# Patient Record
Sex: MALE | Race: WHITE | NOT HISPANIC OR LATINO | Employment: FULL TIME | URBAN - METROPOLITAN AREA
[De-identification: names, ages, dates, MRNs, and addresses within clinical notes are randomized per-mention and may not be internally consistent; named-entity substitution may affect disease eponyms.]

---

## 2020-07-22 ENCOUNTER — TELEMEDICINE (OUTPATIENT)
Dept: GASTROENTEROLOGY | Facility: CLINIC | Age: 50
End: 2020-07-22
Payer: COMMERCIAL

## 2020-07-22 DIAGNOSIS — Z12.11 SCREEN FOR COLON CANCER: Primary | ICD-10-CM

## 2020-07-22 PROCEDURE — 99203 OFFICE O/P NEW LOW 30 MIN: CPT | Performed by: INTERNAL MEDICINE

## 2020-07-22 RX ORDER — CARVEDILOL PHOSPHATE 20 MG/1
20 CAPSULE, EXTENDED RELEASE ORAL DAILY
COMMUNITY
End: 2022-03-08

## 2020-07-22 RX ORDER — LISINOPRIL 10 MG/1
10 TABLET ORAL DAILY
COMMUNITY

## 2020-07-22 NOTE — H&P (VIEW-ONLY)
Virtual Regular Visit      Assessment/Plan:    Problem List Items Addressed This Visit        Other    Screen for colon cancer - Primary     Screening for colon cancer - patient is at average risk for colon cancer screening  Rule out colorectal lesions including polyps or malignancy         -Schedule for colonoscopy  -High-fiber diet     -Patient was given instructions about the colonoscopy prep     -Patient was explained about  the risks and benefits of the procedure  Risks including but not limited to bleeding, infection, perforation were explained in detail  Also explained about less than 100% sensitivity with the exam and other alternatives  Reason for visit is   Chief Complaint   Patient presents with    Virtual Regular Visit        Encounter provider Glenny Barajas MD    Provider located at [address] [place] [id number]      Recent Visits  No visits were found meeting these conditions  Showing recent visits within past 7 days and meeting all other requirements     Today's Visits  Date Type Provider Dept   07/22/20 Telemedicine MD Zoltan Shipman   Showing today's visits and meeting all other requirements     Future Appointments  No visits were found meeting these conditions  Showing future appointments within next 150 days and meeting all other requirements        The patient was identified by name and date of birth  Topherroxann August was informed that this is a telemedicine visit and that the visit is being conducted through [address] [place] [address] and patient was informed that this is not a secure, HIPAA-complaint platform  He agrees to proceed     My office door was closed  No one else was in the room  He acknowledged consent and understanding of privacy and security of the video platform   The patient has agreed to participate and understands they can discontinue the visit at any time  Patient is aware this is a billable service  Felipa Oneil is a 45 y o  male with history of hypertension was referred for colonoscopy  Patient never had colonoscopy in the past   Patient has regular bowel movements and denies any blood or mucus in the stool  Appetite is good and denies any recent weight loss  Denies any abdominal pain, nausea, or vomiting  Has no heartburn or acid reflux  Denies any difficulty swallowing  No past medical history on file  No past surgical history on file  Current Outpatient Medications   Medication Sig Dispense Refill    carvedilol (COREG CR) 20 MG 24 hr capsule Take 20 mg by mouth daily      lisinopril (ZESTRIL) 10 mg tablet Take 10 mg by mouth daily       No current facility-administered medications for this visit  Allergies not on file    REVIEW OF SYSTEMS:    CONSTITUTIONAL: Denies any fever, chills, rigors, and weight loss  HEENT: No earache or tinnitus  Denies hearing loss or visual disturbances  CARDIOVASCULAR: No chest pain or palpitations  RESPIRATORY: Denies any cough, hemoptysis, shortness of breath or dyspnea on exertion  GASTROINTESTINAL: As noted in the History of Present Illness  GENITOURINARY: No problems with urination  Denies any hematuria or dysuria  NEUROLOGIC: No dizziness or vertigo, denies headaches  MUSCULOSKELETAL: Denies any muscle or joint pain  SKIN: Denies skin rashes or itching  ENDOCRINE: Denies excessive thirst  Denies intolerance to heat or cold  PSYCHOSOCIAL: Denies depression or anxiety  Denies any recent memory loss  VIDEO PHYSICAL EXAMINATION:  Appearance and vitals taken from home devices    General Appearance:   Alert, cooperative, no distress   HEENT:  Normocephalic, atraumatic, anicteric  Neck supple, symmetrical, trachea midline  Lungs:   Equal chest rise and unlabored breathing, normal effort, no coughing  Cardiovascular:   No visualized JVD     Abdomen: No abdominal distension  Skin:   No jaundice, rashes, or lesions  Musculoskeletal:   Normal range of motion visualized  Psych:  Normal affect and normal insight  Neuro:  Alert and appropriate  I spent 16 minutes directly with the patient during this visit      VIRTUAL VISIT DISCLAIMER    [facility] acknowledges that he has consented to an online visit or consultation  He understands that the online visit is based solely on information provided by him, and that, in the absence of a face-to-face physical evaluation by the physician, the diagnosis he receives is both limited and provisional in terms of accuracy and completeness  This is not intended to replace a full medical face-to-face evaluation by the physician  Lor Blake understands and accepts these terms

## 2020-07-22 NOTE — PROGRESS NOTES
Virtual Regular Visit      Assessment/Plan:    Problem List Items Addressed This Visit        Other    Screen for colon cancer - Primary     Screening for colon cancer - patient is at average risk for colon cancer screening  Rule out colorectal lesions including polyps or malignancy         -Schedule for colonoscopy  -High-fiber diet     -Patient was given instructions about the colonoscopy prep     -Patient was explained about  the risks and benefits of the procedure  Risks including but not limited to bleeding, infection, perforation were explained in detail  Also explained about less than 100% sensitivity with the exam and other alternatives  Reason for visit is   Chief Complaint   Patient presents with    Virtual Regular Visit        Encounter provider Clementine Alford MD    Provider located at [address] [place], [place] [id number]      Recent Visits  No visits were found meeting these conditions  Showing recent visits within past 7 days and meeting all other requirements     Today's Visits  Date Type Provider Dept   07/22/20 Telemedicine MD Zoltan Saenz   Showing today's visits and meeting all other requirements     Future Appointments  No visits were found meeting these conditions  Showing future appointments within next 150 days and meeting all other requirements        The patient was identified by name and date of birth  Kodi Johnson was informed that this is a telemedicine visit and that the visit is being conducted through [address] [place] [address] and patient was informed that this is not a secure, HIPAA-complaint platform  He agrees to proceed     My office door was closed  No one else was in the room  He acknowledged consent and understanding of privacy and security of the video platform   The patient has agreed to participate and understands they can discontinue the visit at any time  Patient is aware this is a billable service  Ailyn Bella is a 45 y o  male with history of hypertension was referred for colonoscopy  Patient never had colonoscopy in the past   Patient has regular bowel movements and denies any blood or mucus in the stool  Appetite is good and denies any recent weight loss  Denies any abdominal pain, nausea, or vomiting  Has no heartburn or acid reflux  Denies any difficulty swallowing  No past medical history on file  No past surgical history on file  Current Outpatient Medications   Medication Sig Dispense Refill    carvedilol (COREG CR) 20 MG 24 hr capsule Take 20 mg by mouth daily      lisinopril (ZESTRIL) 10 mg tablet Take 10 mg by mouth daily       No current facility-administered medications for this visit  Allergies not on file    REVIEW OF SYSTEMS:    CONSTITUTIONAL: Denies any fever, chills, rigors, and weight loss  HEENT: No earache or tinnitus  Denies hearing loss or visual disturbances  CARDIOVASCULAR: No chest pain or palpitations  RESPIRATORY: Denies any cough, hemoptysis, shortness of breath or dyspnea on exertion  GASTROINTESTINAL: As noted in the History of Present Illness  GENITOURINARY: No problems with urination  Denies any hematuria or dysuria  NEUROLOGIC: No dizziness or vertigo, denies headaches  MUSCULOSKELETAL: Denies any muscle or joint pain  SKIN: Denies skin rashes or itching  ENDOCRINE: Denies excessive thirst  Denies intolerance to heat or cold  PSYCHOSOCIAL: Denies depression or anxiety  Denies any recent memory loss  VIDEO PHYSICAL EXAMINATION:  Appearance and vitals taken from home devices    General Appearance:   Alert, cooperative, no distress   HEENT:  Normocephalic, atraumatic, anicteric  Neck supple, symmetrical, trachea midline  Lungs:   Equal chest rise and unlabored breathing, normal effort, no coughing  Cardiovascular:   No visualized JVD     Abdomen: No abdominal distension  Skin:   No jaundice, rashes, or lesions  Musculoskeletal:   Normal range of motion visualized  Psych:  Normal affect and normal insight  Neuro:  Alert and appropriate  I spent 16 minutes directly with the patient during this visit      VIRTUAL VISIT DISCLAIMER    [facility] acknowledges that he has consented to an online visit or consultation  He understands that the online visit is based solely on information provided by him, and that, in the absence of a face-to-face physical evaluation by the physician, the diagnosis he receives is both limited and provisional in terms of accuracy and completeness  This is not intended to replace a full medical face-to-face evaluation by the physician  Raz Mason understands and accepts these terms

## 2020-07-23 ENCOUNTER — TELEPHONE (OUTPATIENT)
Dept: GASTROENTEROLOGY | Facility: AMBULARY SURGERY CENTER | Age: 50
End: 2020-07-23

## 2020-07-23 NOTE — TELEPHONE ENCOUNTER
Spoke to pt   Pt is scheduled for colonoscopy w/ dr Wilmar Tobin at [id number] [address] on 8/12/2020/suprep ordered/instructions mailed to pt/COVID TEST ORDERED FOR 8/3/2020/PT AWARE

## 2020-07-29 ENCOUNTER — ANESTHESIA EVENT (OUTPATIENT)
Dept: GASTROENTEROLOGY | Facility: AMBULARY SURGERY CENTER | Age: 50
End: 2020-07-29

## 2020-08-03 DIAGNOSIS — Z11.59 SCREENING FOR VIRAL DISEASE: ICD-10-CM

## 2020-08-03 PROCEDURE — U0003 INFECTIOUS AGENT DETECTION BY NUCLEIC ACID (DNA OR RNA); SEVERE ACUTE RESPIRATORY SYNDROME CORONAVIRUS 2 (SARS-COV-2) (CORONAVIRUS DISEASE [COVID-19]), AMPLIFIED PROBE TECHNIQUE, MAKING USE OF HIGH THROUGHPUT TECHNOLOGIES AS DESCRIBED BY CMS-2020-01-R: HCPCS

## 2020-08-04 LAB — SARS-COV-2 RNA SPEC QL NAA+PROBE: NOT DETECTED

## 2020-08-10 ENCOUNTER — TELEPHONE (OUTPATIENT)
Dept: GASTROENTEROLOGY | Facility: CLINIC | Age: 50
End: 2020-08-10

## 2020-08-10 DIAGNOSIS — Z12.11 SCREENING FOR MALIGNANT NEOPLASM OF COLON: Primary | ICD-10-CM

## 2020-08-10 RX ORDER — SODIUM, POTASSIUM,MAG SULFATES 17.5-3.13G
2 SOLUTION, RECONSTITUTED, ORAL ORAL SEE ADMIN INSTRUCTIONS
Qty: 2 BOTTLE | Refills: 0 | Status: SHIPPED | OUTPATIENT
Start: 2020-08-10 | End: 2020-08-12 | Stop reason: ALTCHOICE

## 2020-08-10 NOTE — TELEPHONE ENCOUNTER
Pt called in, never received the suprep at the pharmacy  Pt has a procedure 8/12  Please send suprep to pt's [facility] pharmacy in [employer]  I will email instructions

## 2020-08-12 ENCOUNTER — HOSPITAL ENCOUNTER (OUTPATIENT)
Dept: GASTROENTEROLOGY | Facility: AMBULARY SURGERY CENTER | Age: 50
Setting detail: OUTPATIENT SURGERY
Discharge: HOME | End: 2020-08-12
Attending: INTERNAL MEDICINE
Payer: COMMERCIAL

## 2020-08-12 ENCOUNTER — ANESTHESIA (OUTPATIENT)
Dept: GASTROENTEROLOGY | Facility: AMBULARY SURGERY CENTER | Age: 50
End: 2020-08-12

## 2020-08-12 VITALS
RESPIRATION RATE: 18 BRPM | HEART RATE: 60 BPM | OXYGEN SATURATION: 97 % | BODY MASS INDEX: 24.92 KG/M2 | DIASTOLIC BLOOD PRESSURE: 86 MMHG | HEIGHT: 72 IN | WEIGHT: 184 LBS | SYSTOLIC BLOOD PRESSURE: 151 MMHG | TEMPERATURE: 97.2 F

## 2020-08-12 DIAGNOSIS — Z12.11 SCREEN FOR COLON CANCER: ICD-10-CM

## 2020-08-12 PROBLEM — I10 HTN (HYPERTENSION): Status: ACTIVE | Noted: 2020-08-12

## 2020-08-12 PROCEDURE — G0121 COLON CA SCRN NOT HI RSK IND: HCPCS | Performed by: INTERNAL MEDICINE

## 2020-08-12 RX ORDER — PROPOFOL 10 MG/ML
INJECTION, EMULSION INTRAVENOUS AS NEEDED
Status: DISCONTINUED | OUTPATIENT
Start: 2020-08-12 | End: 2020-08-12

## 2020-08-12 RX ORDER — SODIUM CHLORIDE, SODIUM LACTATE, POTASSIUM CHLORIDE, CALCIUM CHLORIDE 600; 310; 30; 20 MG/100ML; MG/100ML; MG/100ML; MG/100ML
50 INJECTION, SOLUTION INTRAVENOUS CONTINUOUS
Status: CANCELLED | OUTPATIENT
Start: 2020-08-12

## 2020-08-12 RX ORDER — SODIUM CHLORIDE, SODIUM LACTATE, POTASSIUM CHLORIDE, CALCIUM CHLORIDE 600; 310; 30; 20 MG/100ML; MG/100ML; MG/100ML; MG/100ML
INJECTION, SOLUTION INTRAVENOUS CONTINUOUS PRN
Status: DISCONTINUED | OUTPATIENT
Start: 2020-08-12 | End: 2020-08-12

## 2020-08-12 RX ORDER — PROPOFOL 10 MG/ML
INJECTION, EMULSION INTRAVENOUS CONTINUOUS PRN
Status: DISCONTINUED | OUTPATIENT
Start: 2020-08-12 | End: 2020-08-12

## 2020-08-12 RX ORDER — SIMETHICONE 20 MG/.3ML
EMULSION ORAL CODE/TRAUMA/SEDATION MEDICATION
Status: COMPLETED | OUTPATIENT
Start: 2020-08-12 | End: 2020-08-12

## 2020-08-12 RX ADMIN — Medication 40 MG: at 10:47

## 2020-08-12 RX ADMIN — SODIUM CHLORIDE, SODIUM LACTATE, POTASSIUM CHLORIDE, AND CALCIUM CHLORIDE: .6; .31; .03; .02 INJECTION, SOLUTION INTRAVENOUS at 10:39

## 2020-08-12 RX ADMIN — PROPOFOL 50 MG: 10 INJECTION, EMULSION INTRAVENOUS at 10:44

## 2020-08-12 RX ADMIN — PROPOFOL 140 MCG/KG/MIN: 10 INJECTION, EMULSION INTRAVENOUS at 10:46

## 2020-08-12 RX ADMIN — PROPOFOL 150 MG: 10 INJECTION, EMULSION INTRAVENOUS at 10:42

## 2020-08-12 NOTE — ANESTHESIA POSTPROCEDURE EVALUATION
Post-Op Assessment Note    CV Status:  Stable  Pain Score: 0    Pain management: adequate     Mental Status:  Alert and awake   Hydration Status:  Euvolemic   PONV Controlled:  Controlled   Airway Patency:  Patent      Post Op Vitals Reviewed: Yes      Staff: Anesthesiologist, CRNA         No complications documented      /62 (08/12/20 1059)    Temp (!) 97 2 °F (36 2 °C) (08/12/20 1059)    Pulse 68 (08/12/20 1059)   Resp 18 (08/12/20 1059)    SpO2 97 % (08/12/20 1059)

## 2020-08-12 NOTE — ANESTHESIA PREPROCEDURE EVALUATION
Procedure:  COLONOSCOPY    Relevant Problems   ANESTHESIA (within normal limits)      CARDIO   (+) HTN (hypertension)      ENDO (within normal limits)      GI/HEPATIC (within normal limits)      /RENAL (within normal limits)      GYN (within normal limits)      HEMATOLOGY (within normal limits)      MUSCULOSKELETAL (within normal limits)      NEURO/PSYCH (within normal limits)      PULMONARY (within normal limits)        Physical Exam    Airway    Mallampati score: II  TM Distance: >3 FB  Neck ROM: full     Dental   No notable dental hx     Cardiovascular  Cardiovascular exam normal    Pulmonary  Pulmonary exam normal     Other Findings        Anesthesia Plan  ASA Score- 2     Anesthesia Type- IV sedation with anesthesia with ASA Monitors  Additional Monitors:   Airway Plan:           Plan Factors-    Chart reviewed  Induction- intravenous  Postoperative Plan-     Informed Consent- Anesthetic plan and risks discussed with patient  I personally reviewed this patient with the CRNA  Discussed and agreed on the Anesthesia Plan with the CRNA  Arabella Horvath

## 2021-05-06 ENCOUNTER — IMMUNIZATIONS (OUTPATIENT)
Dept: FAMILY MEDICINE CLINIC | Facility: HOSPITAL | Age: 51
End: 2021-05-06

## 2021-05-06 DIAGNOSIS — Z23 ENCOUNTER FOR IMMUNIZATION: Primary | ICD-10-CM

## 2021-05-06 PROCEDURE — 91301 SARS-COV-2 / COVID-19 MRNA VACCINE (MODERNA) 100 MCG: CPT

## 2021-05-06 PROCEDURE — 0011A SARS-COV-2 / COVID-19 MRNA VACCINE (MODERNA) 100 MCG: CPT

## 2021-06-07 ENCOUNTER — IMMUNIZATIONS (OUTPATIENT)
Dept: FAMILY MEDICINE CLINIC | Facility: HOSPITAL | Age: 51
End: 2021-06-07

## 2021-06-07 DIAGNOSIS — Z23 ENCOUNTER FOR IMMUNIZATION: Primary | ICD-10-CM

## 2021-06-07 PROCEDURE — 91301 SARS-COV-2 / COVID-19 MRNA VACCINE (MODERNA) 100 MCG: CPT

## 2021-06-07 PROCEDURE — 0012A SARS-COV-2 / COVID-19 MRNA VACCINE (MODERNA) 100 MCG: CPT

## 2022-02-25 DIAGNOSIS — Z00.00 ENCOUNTER FOR PREVENTIVE HEALTH EXAMINATION: ICD-10-CM

## 2022-03-08 ENCOUNTER — HOSPITAL ENCOUNTER (OUTPATIENT)
Dept: VASCULAR ULTRASOUND | Facility: HOSPITAL | Age: 52
Discharge: HOME | End: 2022-03-08

## 2022-03-08 ENCOUNTER — APPOINTMENT (OUTPATIENT)
Dept: LAB | Facility: CLINIC | Age: 52
End: 2022-03-08

## 2022-03-08 ENCOUNTER — OFFICE VISIT (OUTPATIENT)
Dept: FAMILY MEDICINE CLINIC | Facility: CLINIC | Age: 52
End: 2022-03-08

## 2022-03-08 ENCOUNTER — HOSPITAL ENCOUNTER (OUTPATIENT)
Dept: NON INVASIVE DIAGNOSTICS | Facility: CLINIC | Age: 52
Discharge: HOME | End: 2022-03-08

## 2022-03-08 ENCOUNTER — HOSPITAL ENCOUNTER (OUTPATIENT)
Dept: ULTRASOUND IMAGING | Facility: HOSPITAL | Age: 52
Discharge: HOME | End: 2022-03-08

## 2022-03-08 ENCOUNTER — HOSPITAL ENCOUNTER (OUTPATIENT)
Dept: CT IMAGING | Facility: HOSPITAL | Age: 52
Discharge: HOME | End: 2022-03-08

## 2022-03-08 VITALS
WEIGHT: 180 LBS | HEIGHT: 72 IN | BODY MASS INDEX: 24.38 KG/M2 | DIASTOLIC BLOOD PRESSURE: 84 MMHG | SYSTOLIC BLOOD PRESSURE: 134 MMHG | HEART RATE: 66 BPM

## 2022-03-08 VITALS
WEIGHT: 180.6 LBS | HEIGHT: 72 IN | HEART RATE: 66 BPM | RESPIRATION RATE: 1 BRPM | SYSTOLIC BLOOD PRESSURE: 134 MMHG | BODY MASS INDEX: 24.46 KG/M2 | DIASTOLIC BLOOD PRESSURE: 84 MMHG

## 2022-03-08 DIAGNOSIS — Z00.00 ENCOUNTER FOR PREVENTIVE HEALTH EXAMINATION: ICD-10-CM

## 2022-03-08 DIAGNOSIS — R79.89 ELEVATED SERUM CREATININE: ICD-10-CM

## 2022-03-08 DIAGNOSIS — L30.9 ECZEMA, UNSPECIFIED TYPE: ICD-10-CM

## 2022-03-08 DIAGNOSIS — Z00.00 WELL ADULT EXAM: Primary | ICD-10-CM

## 2022-03-08 DIAGNOSIS — I10 PRIMARY HYPERTENSION: ICD-10-CM

## 2022-03-08 DIAGNOSIS — R77.9 ELEVATED SERUM PROTEIN LEVEL: ICD-10-CM

## 2022-03-08 DIAGNOSIS — E04.1 THYROID NODULE: ICD-10-CM

## 2022-03-08 LAB
25(OH)D3 SERPL-MCNC: 43.9 NG/ML (ref 30–100)
ALBUMIN SERPL BCP-MCNC: 4.3 G/DL (ref 3.5–5)
ALP SERPL-CCNC: 74 U/L (ref 46–116)
ALT SERPL W P-5'-P-CCNC: 30 U/L (ref 12–78)
ANION GAP SERPL CALCULATED.3IONS-SCNC: 6 MMOL/L (ref 4–13)
AORTIC ROOT: 3.3 CM
APICAL FOUR CHAMBER EJECTION FRACTION: 77 %
ASCENDING AORTA: 3 CM (ref 2.05–3.07)
AST SERPL W P-5'-P-CCNC: 20 U/L (ref 5–45)
ATRIAL RATE: 52 BPM
BACTERIA UR QL AUTO: NORMAL /HPF
BASELINE ST DEPRESSION: 0 MM
BASOPHILS # BLD AUTO: 0.04 THOUSANDS/ΜL (ref 0–0.1)
BASOPHILS NFR BLD AUTO: 1 % (ref 0–1)
BILIRUB SERPL-MCNC: 0.82 MG/DL (ref 0.2–1)
BILIRUB UR QL STRIP: NEGATIVE
BUN SERPL-MCNC: 12 MG/DL (ref 5–25)
CALCIUM SERPL-MCNC: 9.4 MG/DL (ref 8.3–10.1)
CHLORIDE SERPL-SCNC: 105 MMOL/L (ref 100–108)
CHOLEST SERPL-MCNC: 181 MG/DL
CLARITY UR: CLEAR
CO2 SERPL-SCNC: 29 MMOL/L (ref 21–32)
COLOR UR: YELLOW
CREAT SERPL-MCNC: 1.19 MG/DL (ref 0.6–1.3)
CRP SERPL HS-MCNC: <0.9 MG/L
E WAVE DECELERATION TIME: 232 MS
EOSINOPHIL # BLD AUTO: 0.3 THOUSAND/ΜL (ref 0–0.61)
EOSINOPHIL NFR BLD AUTO: 6 % (ref 0–6)
ERYTHROCYTE [DISTWIDTH] IN BLOOD BY AUTOMATED COUNT: 12.1 % (ref 11.6–15.1)
EST. AVERAGE GLUCOSE BLD GHB EST-MCNC: 103 MG/DL
FRACTIONAL SHORTENING: 45 % (ref 28–44)
GFR SERPL CREATININE-BSD FRML MDRD: 69 ML/MIN/1.73SQ M
GLUCOSE P FAST SERPL-MCNC: 96 MG/DL (ref 65–99)
GLUCOSE UR STRIP-MCNC: NEGATIVE MG/DL
HBA1C MFR BLD: 5.2 %
HCT VFR BLD AUTO: 46.4 % (ref 36.5–49.3)
HCV AB SER QL: NORMAL
HDLC SERPL-MCNC: 55 MG/DL
HGB BLD-MCNC: 15.7 G/DL (ref 12–17)
HGB UR QL STRIP.AUTO: NEGATIVE
IMM GRANULOCYTES # BLD AUTO: 0.02 THOUSAND/UL (ref 0–0.2)
IMM GRANULOCYTES NFR BLD AUTO: 0 % (ref 0–2)
INTERVENTRICULAR SEPTUM IN DIASTOLE (PARASTERNAL SHORT AXIS VIEW): 1.2 CM
INTERVENTRICULAR SEPTUM: 1.2 CM (ref 0.54–1.01)
KETONES UR STRIP-MCNC: NEGATIVE MG/DL
LAAS-AP4: 20.4 CM2
LDLC SERPL CALC-MCNC: 102 MG/DL (ref 0–100)
LEFT ATRIUM SIZE: 4 CM
LEFT INTERNAL DIMENSION IN SYSTOLE: 2.7 CM (ref 3.03–4.59)
LEFT VENTRICULAR INTERNAL DIMENSION IN DIASTOLE: 4.9 CM (ref 4.98–7.42)
LEFT VENTRICULAR POSTERIOR WALL IN END DIASTOLE: 1.1 CM (ref 0.52–0.99)
LEFT VENTRICULAR STROKE VOLUME: 87 ML
LEUKOCYTE ESTERASE UR QL STRIP: NEGATIVE
LVSV (TEICH): 87 ML
LYMPHOCYTES # BLD AUTO: 1.34 THOUSANDS/ΜL (ref 0.6–4.47)
LYMPHOCYTES NFR BLD AUTO: 24 % (ref 14–44)
MCH RBC QN AUTO: 31.3 PG (ref 26.8–34.3)
MCHC RBC AUTO-ENTMCNC: 33.8 G/DL (ref 31.4–37.4)
MCV RBC AUTO: 92 FL (ref 82–98)
MONOCYTES # BLD AUTO: 0.58 THOUSAND/ΜL (ref 0.17–1.22)
MONOCYTES NFR BLD AUTO: 11 % (ref 4–12)
MV E'TISSUE VEL-SEP: 11 CM/S
MV PEAK A VEL: 0.73 M/S
MV PEAK E VEL: 86 CM/S
MV STENOSIS PRESSURE HALF TIME: 67 MS
MV VALVE AREA P 1/2 METHOD: 3.28 CM2
NEUTROPHILS # BLD AUTO: 3.21 THOUSANDS/ΜL (ref 1.85–7.62)
NEUTS SEG NFR BLD AUTO: 58 % (ref 43–75)
NITRITE UR QL STRIP: NEGATIVE
NON-SQ EPI CELLS URNS QL MICRO: NORMAL /HPF
NRBC BLD AUTO-RTO: 0 /100 WBCS
P AXIS: 50 DEGREES
PH UR STRIP.AUTO: 7 [PH]
PLATELET # BLD AUTO: 226 THOUSANDS/UL (ref 149–390)
PMV BLD AUTO: 11 FL (ref 8.9–12.7)
POTASSIUM SERPL-SCNC: 4.2 MMOL/L (ref 3.5–5.3)
PR INTERVAL: 158 MS
PROT SERPL-MCNC: 8.3 G/DL (ref 6.4–8.2)
PROT UR STRIP-MCNC: NEGATIVE MG/DL
PSA SERPL-MCNC: 1.6 NG/ML (ref 0–4)
QRS AXIS: 12 DEGREES
QRSD INTERVAL: 88 MS
QT INTERVAL: 412 MS
QTC INTERVAL: 383 MS
RATE PRESSURE PRODUCT: NORMAL
RBC # BLD AUTO: 5.02 MILLION/UL (ref 3.88–5.62)
RBC #/AREA URNS AUTO: NORMAL /HPF
RIGHT ATRIUM AREA SYSTOLE A4C: 14.4 CM2
RIGHT VENTRICLE ID DIMENSION: 3.4 CM
SL CV LV EF: 60
SL CV LV EF: 60
SL CV PED ECHO LEFT VENTRICLE DIASTOLIC VOLUME (MOD BIPLANE) 2D: 113 ML
SL CV PED ECHO LEFT VENTRICLE SYSTOLIC VOLUME (MOD BIPLANE) 2D: 26 ML
SL CV STRESS RECOVERY BP: NORMAL MMHG
SL CV STRESS RECOVERY HR: 88 BPM
SODIUM SERPL-SCNC: 140 MMOL/L (ref 136–145)
SP GR UR STRIP.AUTO: <=1.005 (ref 1–1.03)
STRESS ANGINA INDEX: 0
STRESS BASELINE BP: NORMAL MMHG
STRESS BASELINE HR: 58 BPM
STRESS DUKE TREADMILL SCORE: 13
STRESS O2 SAT REST: 100 %
STRESS PEAK HR: 164 BPM
STRESS PERCENT HR: 97 %
STRESS POST ESTIMATED WORKLOAD: 15.3 METS
STRESS POST EXERCISE DUR MIN: 13 MIN
STRESS POST O2 SAT PEAK: 96 %
STRESS POST PEAK BP: 144 MMHG
STRESS ST DEPRESSION: 0 MM
STRESS TARGET HR: 164 BPM
T WAVE AXIS: -4 DEGREES
TR MAX PG: 26 MMHG
TR PEAK VELOCITY: 2.5 M/S
TRICUSPID VALVE PEAK REGURGITATION VELOCITY: 2.54 M/S
TRIGL SERPL-MCNC: 118 MG/DL
TSH SERPL DL<=0.05 MIU/L-ACNC: 0.96 UIU/ML (ref 0.36–3.74)
UROBILINOGEN UR QL STRIP.AUTO: 0.2 E.U./DL
VENTRICULAR RATE: 52 BPM
WBC # BLD AUTO: 5.49 THOUSAND/UL (ref 4.31–10.16)
WBC #/AREA URNS AUTO: NORMAL /HPF
Z-SCORE OF ASCENDING AORTA: 1.72
Z-SCORE OF INTERVENTRICULAR SEPTUM IN END DIASTOLE: 3.58
Z-SCORE OF LEFT VENTRICULAR DIMENSION IN END DIASTOLE: -2.16
Z-SCORE OF LEFT VENTRICULAR DIMENSION IN END SYSTOLE: -2.55
Z-SCORE OF LEFT VENTRICULAR POSTERIOR WALL IN END DIASTOLE: 2.86

## 2022-03-08 PROCEDURE — 93306 TTE W/DOPPLER COMPLETE: CPT

## 2022-03-08 PROCEDURE — 83036 HEMOGLOBIN GLYCOSYLATED A1C: CPT

## 2022-03-08 PROCEDURE — 93010 ELECTROCARDIOGRAM REPORT: CPT | Performed by: INTERNAL MEDICINE

## 2022-03-08 PROCEDURE — 86141 C-REACTIVE PROTEIN HS: CPT

## 2022-03-08 PROCEDURE — 80053 COMPREHEN METABOLIC PANEL: CPT

## 2022-03-08 PROCEDURE — 99499EX: Performed by: FAMILY MEDICINE

## 2022-03-08 PROCEDURE — 82306 VITAMIN D 25 HYDROXY: CPT

## 2022-03-08 PROCEDURE — VASC: Performed by: SURGERY

## 2022-03-08 PROCEDURE — 93922 UPR/L XTREMITY ART 2 LEVELS: CPT

## 2022-03-08 PROCEDURE — 93351 STRESS TTE COMPLETE: CPT | Performed by: INTERNAL MEDICINE

## 2022-03-08 PROCEDURE — 93306 TTE W/DOPPLER COMPLETE: CPT | Performed by: INTERNAL MEDICINE

## 2022-03-08 PROCEDURE — 93350 STRESS TTE ONLY: CPT

## 2022-03-08 PROCEDURE — 86803 HEPATITIS C AB TEST: CPT

## 2022-03-08 PROCEDURE — 36415 COLL VENOUS BLD VENIPUNCTURE: CPT

## 2022-03-08 PROCEDURE — 85025 COMPLETE CBC W/AUTO DIFF WBC: CPT

## 2022-03-08 PROCEDURE — 80061 LIPID PANEL: CPT

## 2022-03-08 PROCEDURE — 84443 ASSAY THYROID STIM HORMONE: CPT

## 2022-03-08 PROCEDURE — 84153 ASSAY OF PSA TOTAL: CPT

## 2022-03-08 PROCEDURE — 81001 URINALYSIS AUTO W/SCOPE: CPT

## 2022-03-08 PROCEDURE — 76700 US EXAM ABDOM COMPLETE: CPT

## 2022-03-08 PROCEDURE — 93005 ELECTROCARDIOGRAM TRACING: CPT

## 2022-03-08 PROCEDURE — G1004 CDSM NDSC: HCPCS

## 2022-03-08 PROCEDURE — 75571 CT HRT W/O DYE W/CA TEST: CPT

## 2022-03-08 RX ORDER — NEBIVOLOL 5 MG/1
5 TABLET ORAL DAILY
Qty: 30 TABLET | Refills: 0
Start: 2022-03-08

## 2022-03-08 NOTE — ASSESSMENT & PLAN NOTE
Your blood pressure is reasonably controlled today on your current medications; nebivolol 5 mg daily in AM, and lisinopril 10 mg in PM   I think it is reasonable to continue these medications  Continue regular follow-up with your cardiologist as directed

## 2022-03-08 NOTE — PROGRESS NOTES
Fitness Summary and Recommendations:  Josephine Brothers scored at the 95% on his body composition assessment with a bodyfat % of 12 5%  Josephine Brothers scored in the average range for flexibility with a sit & reach score of 20 cm  His Muscle Strength/Endurance scores placed him at the 80% (lower body) and 95% (upper body) with a chair stand score of 29 and arm curl score of 34 respectively  Shaquilles Cardiovascular Score of 60 3 placed him at the 95%  Overall, Josephine Brothers would be considered to have an excellent fitness level with a 81% score  Continued emphasis on regular exercise and sound nutrition practices will enable Josephine Brothers to reach his optimal level of fitness

## 2022-03-08 NOTE — PROGRESS NOTES
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  He denies ear pain, tinnitus and dizziness  He reports some difficulty understanding at times  EVALUATION:    Otoscopic Evaluation:   Right Ear: Clear and healthy ear canal and tympanic membrane   Left Ear: Clear and healthy ear canal and tympanic membrane    Tympanometry:   Right: Type A - normal middle ear pressure and compliance   Left: Type A - normal middle ear pressure and compliance    Audiogram Results:  Normal peripheral hearing sensitivity in each ear  Excellent speech discrimination ability bilaterally  *see attached audiogram      RECOMMENDATIONS:  Return to [facility]  for F/U, Hearing Protection, Copy to Patient/Caregiver and Hearing evaluation in 1-2 years    PATIENT EDUCATION:   Discussed results and recommendations with patient  Questions were addressed and the patient was encouraged to contact our department should concerns arise        Nirmala Boyle   Clinical Audiologist       Visual Acuity Screening    Right eye Left eye Both eyes   Without correction:      With correction: 20/20 20/25 20/20

## 2022-03-08 NOTE — ASSESSMENT & PLAN NOTE
Your serum creatinine level was mildly elevated today  Although your overall kidney function was pretty normal   This can be caused by exercise, high-protein diets, dehydration, and other factors    I would recommend checking your kidney function numbers at least once a year

## 2022-03-08 NOTE — ASSESSMENT & PLAN NOTE
Your labs today were essentially normal   Your examination today was also normal   Your blood pressure is under good control on your current medications  Your current with colonoscopy (2021)  You received your initial COVID vaccination series  I would consider getting booster as well  It also looks like you are due for a tetanus booster  I would consider contacting your PCP to have this done  Also, we discussed the new shingles vaccine (Shingrix)  This is something now that is recommended for anyone over the age of 48  Lastly, I would recommend continuing healthy lifestyle including diet and exercise

## 2022-03-08 NOTE — ASSESSMENT & PLAN NOTE
You have dry skin (eczema) on the backs of your legs and sides of your abdomen  This is a common condition exacerbated by dry winter weather  Recommend starting OTC hydrocortisone ointment 1%  Use this a few times a day as needed (decreasing to as needed usage as condition improves)    Also recommend moisturizing areas of dry skin with OTC cream such as Vanicream or Eucerin cream

## 2022-03-08 NOTE — PROGRESS NOTES
[facility] Physical Exam     Kodi Johnson is a 45 y o  male who is presenting for an his first [facility] Physical Exam at [address]  Mr Monique Tobar is the  of [employer]  He lives in [place], [place] with his wife and 2 children  His past medical history is significant for hypertension, for which he takes nebivolol and lisinopril  He only other medications include OTC vitamins/supplements (including a multivitamin, vitamin D 2000 IU, vitamin E 400 IU, omega 3, 2500mg, Red Yeast Rice, and CoQ10)  His family history is significant for hypertension, hyperlipidemia, anemia, and multiple myeloma  Mr Monique Tobar is a nonsmoker  He drinks wine (a few glasses per week on average)  He drinks approximately 2 cups of coffee per day, only in the morning  He considers his diet to be healthy  He exercises on a regular basis; elliptical training daily along with resistance training  Patient also plays tennis  Overall he is feeling well  He does complain of episodes of low back pain which occur  in the early morning hours  Fortunately, the pain resolves after stretching in the morning  Review of Systems   Constitutional: Negative for chills and fever  HENT: Negative for ear pain and sore throat  Eyes: Negative for pain and visual disturbance  Respiratory: Negative for cough and shortness of breath  Cardiovascular: Negative for chest pain and palpitations  Gastrointestinal: Negative for abdominal pain and vomiting  Genitourinary: Negative for dysuria and hematuria  Musculoskeletal: Positive for back pain  Negative for arthralgias  Skin: Negative for color change and rash  Neurological: Negative for seizures and syncope  All other systems reviewed and are negative          Active Ambulatory Problems     Diagnosis Date Noted    Well adult exam 07/22/2020    HTN (hypertension) 08/12/2020    Eczema 03/08/2022    Thyroid nodule 03/08/2022    Elevated serum creatinine 03/08/2022    Elevated serum protein level 03/08/2022     Resolved Ambulatory Problems     Diagnosis Date Noted    No Resolved Ambulatory Problems     Past Medical History:   Diagnosis Date    Hypertension        History reviewed  No pertinent surgical history  History reviewed  No pertinent family history  Social History     Tobacco Use   Smoking Status Never Smoker   Smokeless Tobacco Not on file         Current Outpatient Medications:     lisinopril (ZESTRIL) 10 mg tablet, Take 10 mg by mouth daily, Disp: , Rfl:     nebivolol (BYSTOLIC) 5 mg tablet, Take 1 tablet (5 mg total) by mouth daily, Disp: 30 tablet, Rfl: 0    Allergies   Allergen Reactions    Nuts - Food Allergy GI Intolerance     Tree nuts    Penicillins          Objective:    Vitals:    03/08/22 0812   BP: 134/84   Pulse: 66   Resp: (!) 1   Weight: 81 9 kg (180 lb 9 6 oz)   Height: 6' (1 829 m)        Physical Exam  Constitutional:       Appearance: Normal appearance  HENT:      Head: Normocephalic and atraumatic  Right Ear: Tympanic membrane, ear canal and external ear normal  There is no impacted cerumen  Left Ear: Tympanic membrane, ear canal and external ear normal  There is no impacted cerumen  Nose: Nose normal       Mouth/Throat:      Mouth: Mucous membranes are moist       Pharynx: Oropharynx is clear  No posterior oropharyngeal erythema  Eyes:      Extraocular Movements: Extraocular movements intact  Conjunctiva/sclera: Conjunctivae normal       Pupils: Pupils are equal, round, and reactive to light  Neck:      Vascular: No carotid bruit  Cardiovascular:      Rate and Rhythm: Normal rate and regular rhythm  Pulses: Normal pulses  Heart sounds: Normal heart sounds  No murmur heard  Pulmonary:      Effort: Pulmonary effort is normal       Breath sounds: Normal breath sounds  Abdominal:      General: Abdomen is flat   Bowel sounds are normal  There is no distension  Palpations: Abdomen is soft  There is no mass  Tenderness: There is no abdominal tenderness  Hernia: No hernia is present  Genitourinary:     Prostate: Normal       Rectum: Guaiac result negative  Comments: ANN:  Prostate somewhat enlarged (1 5x)  No nodules or asymmetries present  Musculoskeletal:         General: Normal range of motion  Cervical back: Normal range of motion and neck supple  Lymphadenopathy:      Cervical: No cervical adenopathy  Skin:     General: Skin is warm  Capillary Refill: Capillary refill takes less than 2 seconds  Coloration: Skin is not jaundiced  Findings: No rash  Neurological:      General: No focal deficit present  Mental Status: He is alert and oriented to person, place, and time  Cranial Nerves: No cranial nerve deficit  Motor: No weakness  Gait: Gait normal    Psychiatric:         Mood and Affect: Mood normal          Behavior: Behavior normal          Thought Content: Thought content normal          Judgment: Judgment normal              Assessment/Plan:     Here are the findings from today's exam:(also see cardiology and dermatology reports)        1  Well adult exam  Assessment & Plan: Your labs today were essentially normal   Your examination today was also normal   Your blood pressure is under good control on your current medications  Your current with colonoscopy (2021)  You received your initial COVID vaccination series  I would consider getting booster as well  It also looks like you are due for a tetanus booster  I would consider contacting your PCP to have this done  Also, we discussed the new shingles vaccine (Shingrix)  This is something now that is recommended for anyone over the age of 48  Lastly, I would recommend continuing healthy lifestyle including diet and exercise  2  Primary hypertension  Assessment & Plan:   Your blood pressure is reasonably controlled today on your current medications; nebivolol 5 mg daily in AM, and lisinopril 10 mg in PM   I think it is reasonable to continue these medications  Continue regular follow-up with your cardiologist as directed  Orders:  -     nebivolol (BYSTOLIC) 5 mg tablet; Take 1 tablet (5 mg total) by mouth daily    3  Thyroid nodule  Assessment & Plan:  You have a small 8 mm nodule on the right lobe of your thyroid gland  Thyroid nodules are very common, and most are benign  That being said, I would like to send you for a dedicated ultrasound in 6 months to ensure stability  I will contact you with results  Orders:  -     US thyroid; Future; Expected date: 09/08/2022    4  Eczema, unspecified type  Assessment & Plan:  You have dry skin (eczema) on the backs of your legs and sides of your abdomen  This is a common condition exacerbated by dry winter weather  Recommend starting OTC hydrocortisone ointment 1%  Use this a few times a day as needed (decreasing to as needed usage as condition improves)  Also recommend moisturizing areas of dry skin with OTC cream such as Vanicream or Eucerin cream        5  Elevated serum protein level  Assessment & Plan: Your serum protein level was slightly elevated at 8 3  This may be a normal variant, but out of an abundance of caution, I would like to send you for an additional blood test due to your family history of multiple myeloma (serum protein electrophoresis)  I will contact you once I receive these results  Orders:  -     Protein electrophoresis, serum; Future; Expected date: 03/08/2022    6  Elevated serum creatinine  Assessment & Plan: Your serum creatinine level was mildly elevated today  Although your overall kidney function was pretty normal   This can be caused by exercise, high-protein diets, dehydration, and other factors  I would recommend checking your kidney function numbers at least once a year    Orders:  -     Protein electrophoresis, serum;  Future; Expected date: 03/08/2022      Shruthi Clement you for choosing [address] [place] [address] Dr Richter  It was a pleasure meeting and getting to know you today  If you have any questions regarding today's exam, feel free to contact me  We hope to see you again in the future  Gladys Muller ([id number]  Physician)  [phone] (cell)  [email or web address]

## 2022-03-08 NOTE — ASSESSMENT & PLAN NOTE
Your serum protein level was slightly elevated at 8 3  This may be a normal variant, but out of an abundance of caution, I would like to send you for an additional blood test due to your family history of multiple myeloma (serum protein electrophoresis)  I will contact you once I receive these results

## 2022-03-08 NOTE — PROGRESS NOTES
Heart and Vascular Summary:     1  Baseline EKG:   Sinus bradycardia, otherwise normal ECG  Your heart rate is slow, which is likely a reflection of good conditioning  2  Echocardiogram:  Normal right and left ventricular size and function  Left ventricular ejection fraction (EF) was 60%  There were no significant valve problems  You have a structurally normal heart  3  Stress Echocardiogram: Good exercise capacity (13 mins), achieving 15 3 METS, and 97% of maximal predicted heart rate  You had no significant EKG changes during stress that suggest any ischemia or blockages over 50%  Blood pressure was normal at the start of the test, with a normal response to exercise (peak blood pressure of 295 systolic)  You had a good heart rate and blood pressure recovery after exercise  Of note, you did have a few extra beats coming from the top and bottom parts of your heart (premature atrial contracts, PACs and premature ventricular contractions, PVCs - both of which are normal)  Based on the Hayes Treadmill score, there is a low risk for cardiac events  Normal baseline left ventricular wall motion and function on echocardiogram, with no significant wall motion abnormalities or reduction in function with peak exercise  This confirms that there are no functionally significant blockages over 50% in the coronary arteries at this time  4  CT Coronary Calcium Score: The total calcium score was 0  This does not exclude non-calcified plaque  The score of 0 suggests a low risk for significant cardiovascular events in the next 5 years, placing you in the 47th percentile for age  5  Abdominal Ultrasound: This revealed a normal sized abdominal aorta (screen for aneurysms)  6  Lipid Profile: this revealed total cholesterol of 181, and LDL of 102 HDL of 55 and Triglyceride level of 118  The total cholesterol is a sum of its individual parts    The HDL is the good cholesterol, which is protective to your heart  Your level of 55 is excellent  The Triglycerides are a marker of your diet and genetics  Less than 150 is what we aim for, and your level is well controlled  The LDL is the bad cholesterol, the cholesterol that builds atherosclerotic plaque in your arteries  The level of 102 is also at your goal of less than 120     7  10-year calculated cardiovascular risk: using the ACC/AHA ASCVD Risk Calculator for 10 year risk of heart disease or stroke, your percentage came to 4 1%, which is minimally higher than optimal of 2 6%  Lifestyle modifications including a heart-conscious diet and exercise regimen are recommended to manage cardiovascular health  Any symptoms to suggest active coronary disease such as chest pain, shortness of breath, and increased fatigue with exercise should be reported to your doctor for further evaluation

## 2022-03-08 NOTE — ASSESSMENT & PLAN NOTE
You have a small 8 mm nodule on the right lobe of your thyroid gland  Thyroid nodules are very common, and most are benign  That being said, I would like to send you for a dedicated ultrasound in 6 months to ensure stability  I will contact you with results

## 2022-03-08 NOTE — PROGRESS NOTES
Nutritional Summary and Recommendations:     Patient Nutrition-Oriented Medical/Diet Hx  The patient is a 45 y o  M who presents today to [facility] for nutrition assessment and counseling  Pt reports making dietary changes over the past two weeks to include not skipping lunch and decreasing sugary snacks at night and on weekends  Discussed well-balanced nutrition and ways to decrease sugar intake by choosing lower sugar alternatives  Pt overall eats lean protein, whole grains, fruits, vegetables, and heart healthy fats  Labs reviewed  Encouraged pt to continue with recent diet changes and discussed tips for success  Current Outpatient Medications:  Lisinopril  MVI  Omega 3  Vitamin E  Vitamin D3  Immune support   Red yeast rice      Past Medical History:  Past Medical History:   Diagnosis Date    Hypertension         Labs:  A1C 5 2  Triglycerides 118  HDL: 55  LDL: 102  Vitamin D 43 9     Anthropometrics:     Ht: 6 ft     Wt: 181 lb     BMI: 24 5      UBW: 185 lb     DBW: 175 lb     BMR: 3256 kcals     Fat%: 12 5%     Fat Mass: 22 6 lb     FFM: 158 4 lb     TBW: 116 lb     Nutrition Interventions/Recommendations:    1  Continue to eat well-balanced meals and do not skip lunch  2  Aim to decrease daily added sugar to no more than 36 g (~150 Calories) per day  3  Incorporate more plant-based proteins including beans and lentils  4  Contact RD with any questions or concerns              Perceived Comprehension: Good      Expected Compliance:  Good

## 2022-03-10 LAB
CHEST PAIN STATEMENT: NORMAL
MAX DIASTOLIC BP: 64 MMHG
MAX HEART RATE: 164 BPM
MAX PREDICTED HEART RATE: 168 BPM
MAX. SYSTOLIC BP: 160 MMHG
PROTOCOL NAME: NORMAL
REASON FOR TERMINATION: NORMAL
TARGET HR FORMULA: NORMAL
TEST INDICATION: NORMAL
TIME IN EXERCISE PHASE: NORMAL

## 2022-04-15 LAB
ALBUMIN SERPL ELPH-MCNC: 4.5 G/DL (ref 2.9–4.4)
ALBUMIN/GLOB SERPL: 1.5 {RATIO} (ref 0.7–1.7)
ALPHA1 GLOB SERPL ELPH-MCNC: 0.2 G/DL (ref 0–0.4)
ALPHA2 GLOB SERPL ELPH-MCNC: 0.5 G/DL (ref 0.4–1)
B-GLOBULIN SERPL ELPH-MCNC: 1.2 G/DL (ref 0.7–1.3)
GAMMA GLOB SERPL ELPH-MCNC: 1.2 G/DL (ref 0.4–1.8)
GLOBULIN SER CALC-MCNC: 3 G/DL (ref 2.2–3.9)
LABORATORY COMMENT REPORT: ABNORMAL
M PROTEIN SERPL ELPH-MCNC: ABNORMAL G/DL
PROT SERPL-MCNC: 7.5 G/DL (ref 6–8.5)

## 2022-10-11 PROBLEM — Z00.00 WELL ADULT EXAM: Status: RESOLVED | Noted: 2020-07-22 | Resolved: 2022-10-11

## 2022-12-15 ENCOUNTER — HOSPITAL ENCOUNTER (OUTPATIENT)
Dept: ULTRASOUND IMAGING | Facility: HOSPITAL | Age: 52
Discharge: HOME | End: 2022-12-15

## 2022-12-15 DIAGNOSIS — E04.1 THYROID NODULE: ICD-10-CM

## 2024-03-13 DIAGNOSIS — Z00.00 ENCOUNTER FOR PREVENTIVE HEALTH EXAMINATION: Primary | ICD-10-CM

## 2024-04-03 ENCOUNTER — HOSPITAL ENCOUNTER (OUTPATIENT)
Dept: NON INVASIVE DIAGNOSTICS | Facility: CLINIC | Age: 54
Discharge: HOME | End: 2024-04-03

## 2024-04-03 ENCOUNTER — OFFICE VISIT (OUTPATIENT)
Dept: FAMILY MEDICINE CLINIC | Facility: CLINIC | Age: 54
End: 2024-04-03

## 2024-04-03 ENCOUNTER — APPOINTMENT (OUTPATIENT)
Dept: LAB | Facility: CLINIC | Age: 54
End: 2024-04-03

## 2024-04-03 VITALS
WEIGHT: 188 LBS | SYSTOLIC BLOOD PRESSURE: 126 MMHG | HEART RATE: 62 BPM | HEIGHT: 72 IN | BODY MASS INDEX: 25.47 KG/M2 | DIASTOLIC BLOOD PRESSURE: 78 MMHG

## 2024-04-03 VITALS
SYSTOLIC BLOOD PRESSURE: 126 MMHG | HEART RATE: 65 BPM | DIASTOLIC BLOOD PRESSURE: 78 MMHG | BODY MASS INDEX: 25.57 KG/M2 | WEIGHT: 188.8 LBS | OXYGEN SATURATION: 98 % | HEIGHT: 72 IN | TEMPERATURE: 97.6 F

## 2024-04-03 DIAGNOSIS — Z00.00 ENCOUNTER FOR PREVENTIVE HEALTH EXAMINATION: ICD-10-CM

## 2024-04-03 DIAGNOSIS — Z00.00 WELL ADULT EXAM: Primary | ICD-10-CM

## 2024-04-03 DIAGNOSIS — E04.1 THYROID NODULE: ICD-10-CM

## 2024-04-03 DIAGNOSIS — E78.5 HYPERLIPIDEMIA, UNSPECIFIED HYPERLIPIDEMIA TYPE: ICD-10-CM

## 2024-04-03 DIAGNOSIS — N40.0 ENLARGED PROSTATE: ICD-10-CM

## 2024-04-03 DIAGNOSIS — I10 PRIMARY HYPERTENSION: ICD-10-CM

## 2024-04-03 LAB
25(OH)D3 SERPL-MCNC: 39.5 NG/ML (ref 30–100)
ALBUMIN SERPL BCP-MCNC: 4.8 G/DL (ref 3.5–5)
ALP SERPL-CCNC: 71 U/L (ref 34–104)
ALT SERPL W P-5'-P-CCNC: 27 U/L (ref 7–52)
ANION GAP SERPL CALCULATED.3IONS-SCNC: 7 MMOL/L (ref 4–13)
AORTIC ROOT: 3.6 CM
AORTIC VALVE MEAN VELOCITY: 11.5 M/S
APICAL FOUR CHAMBER EJECTION FRACTION: 62 %
ASCENDING AORTA: 3.4 CM
AST SERPL W P-5'-P-CCNC: 24 U/L (ref 13–39)
ATRIAL RATE: 55 BPM
AV AREA BY CONTINUOUS VTI: 2.7 CM2
AV AREA PEAK VELOCITY: 2.9 CM2
AV LVOT MEAN GRADIENT: 3 MMHG
AV LVOT PEAK GRADIENT: 6 MMHG
AV MEAN GRADIENT: 6 MMHG
AV PEAK GRADIENT: 12 MMHG
AV VALVE AREA: 2.65 CM2
AV VELOCITY RATIO: 0.7
BACTERIA UR QL AUTO: ABNORMAL /HPF
BASOPHILS # BLD AUTO: 0.05 THOUSANDS/ÂΜL (ref 0–0.1)
BASOPHILS NFR BLD AUTO: 1 % (ref 0–1)
BILIRUB SERPL-MCNC: 1.24 MG/DL (ref 0.2–1)
BILIRUB UR QL STRIP: NEGATIVE
BSA FOR ECHO PROCEDURE: 2.06 M2
BUN SERPL-MCNC: 15 MG/DL (ref 5–25)
CALCIUM SERPL-MCNC: 9.7 MG/DL (ref 8.4–10.2)
CHEST PAIN STATEMENT: NORMAL
CHLORIDE SERPL-SCNC: 102 MMOL/L (ref 96–108)
CHOLEST SERPL-MCNC: 160 MG/DL
CLARITY UR: CLEAR
CO2 SERPL-SCNC: 30 MMOL/L (ref 21–32)
COLOR UR: COLORLESS
CREAT SERPL-MCNC: 1.16 MG/DL (ref 0.6–1.3)
CRP SERPL HS-MCNC: 0.44 MG/L
DOP CALC AO PEAK VEL: 1.71 M/S
DOP CALC AO VTI: 38.43 CM
DOP CALC LVOT AREA: 4.15 CM2
DOP CALC LVOT CARDIAC INDEX: 3.05 L/MIN/M2
DOP CALC LVOT CARDIAC OUTPUT: 6.31 L/MIN
DOP CALC LVOT DIAMETER: 2.3 CM
DOP CALC LVOT PEAK VEL VTI: 24.57 CM
DOP CALC LVOT PEAK VEL: 1.19 M/S
DOP CALC LVOT STROKE INDEX: 49.3 ML/M2
DOP CALC LVOT STROKE VOLUME: 102.03
E WAVE DECELERATION TIME: 169 MS
E/A RATIO: 1.3
EOSINOPHIL # BLD AUTO: 0.19 THOUSAND/ÂΜL (ref 0–0.61)
EOSINOPHIL NFR BLD AUTO: 4 % (ref 0–6)
ERYTHROCYTE [DISTWIDTH] IN BLOOD BY AUTOMATED COUNT: 12 % (ref 11.6–15.1)
EST. AVERAGE GLUCOSE BLD GHB EST-MCNC: 105 MG/DL
FRACTIONAL SHORTENING: 26 (ref 28–44)
GFR SERPL CREATININE-BSD FRML MDRD: 70 ML/MIN/1.73SQ M
GLUCOSE P FAST SERPL-MCNC: 96 MG/DL (ref 65–99)
GLUCOSE UR STRIP-MCNC: NEGATIVE MG/DL
HBA1C MFR BLD: 5.3 %
HCT VFR BLD AUTO: 48.4 % (ref 36.5–49.3)
HDLC SERPL-MCNC: 50 MG/DL
HGB BLD-MCNC: 15.9 G/DL (ref 12–17)
HGB UR QL STRIP.AUTO: NEGATIVE
IMM GRANULOCYTES # BLD AUTO: 0.02 THOUSAND/UL (ref 0–0.2)
IMM GRANULOCYTES NFR BLD AUTO: 0 % (ref 0–2)
INTERVENTRICULAR SEPTUM IN DIASTOLE (PARASTERNAL SHORT AXIS VIEW): 1.3 CM
INTERVENTRICULAR SEPTUM: 1.3 CM (ref 0.6–1.1)
KETONES UR STRIP-MCNC: NEGATIVE MG/DL
LAAS-AP2: 22.1 CM2
LAAS-AP4: 20.4 CM2
LDLC SERPL CALC-MCNC: 88 MG/DL (ref 0–100)
LEFT ATRIUM SIZE: 4 CM
LEFT ATRIUM VOLUME (MOD BIPLANE): 62 ML
LEFT ATRIUM VOLUME INDEX (MOD BIPLANE): 30 ML/M2
LEFT INTERNAL DIMENSION IN SYSTOLE: 3.5 CM (ref 2.1–4)
LEFT VENTRICULAR INTERNAL DIMENSION IN DIASTOLE: 4.7 CM (ref 3.5–6)
LEFT VENTRICULAR POSTERIOR WALL IN END DIASTOLE: 1.3 CM
LEFT VENTRICULAR STROKE VOLUME: 50 ML
LEUKOCYTE ESTERASE UR QL STRIP: NEGATIVE
LVSV (TEICH): 50 ML
LYMPHOCYTES # BLD AUTO: 1.14 THOUSANDS/ÂΜL (ref 0.6–4.47)
LYMPHOCYTES NFR BLD AUTO: 21 % (ref 14–44)
MAX DIASTOLIC BP: 52 MMHG
MAX HR PERCENT: 102 %
MAX HR: 169 BPM
MAX PREDICTED HEART RATE: 166 BPM
MCH RBC QN AUTO: 30.8 PG (ref 26.8–34.3)
MCHC RBC AUTO-ENTMCNC: 32.9 G/DL (ref 31.4–37.4)
MCV RBC AUTO: 94 FL (ref 82–98)
MONOCYTES # BLD AUTO: 0.56 THOUSAND/ÂΜL (ref 0.17–1.22)
MONOCYTES NFR BLD AUTO: 10 % (ref 4–12)
MV E'TISSUE VEL-LAT: 8 CM/S
MV E'TISSUE VEL-SEP: 9 CM/S
MV PEAK A VEL: 0.64 M/S
MV PEAK E VEL: 83 CM/S
NEUTROPHILS # BLD AUTO: 3.42 THOUSANDS/ÂΜL (ref 1.85–7.62)
NEUTS SEG NFR BLD AUTO: 64 % (ref 43–75)
NITRITE UR QL STRIP: NEGATIVE
NON-SQ EPI CELLS URNS QL MICRO: ABNORMAL /HPF
NRBC BLD AUTO-RTO: 0 /100 WBCS
P AXIS: 61 DEGREES
PH UR STRIP.AUTO: 7 [PH]
PLATELET # BLD AUTO: 231 THOUSANDS/UL (ref 149–390)
PMV BLD AUTO: 10.6 FL (ref 8.9–12.7)
POTASSIUM SERPL-SCNC: 3.7 MMOL/L (ref 3.5–5.3)
PR INTERVAL: 164 MS
PROT SERPL-MCNC: 7.9 G/DL (ref 6.4–8.4)
PROT UR STRIP-MCNC: NEGATIVE MG/DL
PROTOCOL NAME: NORMAL
PSA SERPL-MCNC: 1.54 NG/ML (ref 0–4)
QRS AXIS: 23 DEGREES
QRSD INTERVAL: 92 MS
QT INTERVAL: 406 MS
QTC INTERVAL: 388 MS
RATE PRESSURE PRODUCT: NORMAL
RBC # BLD AUTO: 5.16 MILLION/UL (ref 3.88–5.62)
RBC #/AREA URNS AUTO: ABNORMAL /HPF
REASON FOR TERMINATION: NORMAL
RIGHT ATRIUM AREA SYSTOLE A4C: 19.9 CM2
RIGHT VENTRICLE ID DIMENSION: 4 CM
SL CV LEFT ATRIUM LENGTH A2C: 5.4 CM
SL CV LV EF: 55
SL CV LV EF: 55
SL CV PED ECHO LEFT VENTRICLE DIASTOLIC VOLUME (MOD BIPLANE) 2D: 102 ML
SL CV PED ECHO LEFT VENTRICLE SYSTOLIC VOLUME (MOD BIPLANE) 2D: 52 ML
SL CV STRESS RECOVERY BP: NORMAL MMHG
SL CV STRESS RECOVERY HR: 100 BPM
SL CV STRESS RECOVERY O2 SAT: 98 %
SL CV STRESS STAGE REACHED: 4
SODIUM SERPL-SCNC: 139 MMOL/L (ref 135–147)
SP GR UR STRIP.AUTO: 1 (ref 1–1.03)
STRESS ANGINA INDEX: 0
STRESS BASELINE BP: NORMAL MMHG
STRESS BASELINE HR: 81 BPM
STRESS O2 SAT REST: 99 %
STRESS PEAK HR: 169 BPM
STRESS POST ESTIMATED WORKLOAD: 13.4 METS
STRESS POST EXERCISE DUR MIN: 12 MIN
STRESS POST EXERCISE DUR MIN: 12 MIN
STRESS POST EXERCISE DUR SEC: 0 SEC
STRESS POST O2 SAT PEAK: 98 %
STRESS POST PEAK BP: 186 MMHG
STRESS POST PEAK HR: 203 BPM
STRESS POST PEAK SYSTOLIC BP: 186 MMHG
T WAVE AXIS: 5 DEGREES
TARGET HR FORMULA: NORMAL
TRICUSPID ANNULAR PLANE SYSTOLIC EXCURSION: 3.2 CM
TRIGL SERPL-MCNC: 110 MG/DL
TSH SERPL DL<=0.05 MIU/L-ACNC: 1.04 UIU/ML (ref 0.45–4.5)
UROBILINOGEN UR STRIP-ACNC: <2 MG/DL
VENTRICULAR RATE: 55 BPM
WBC # BLD AUTO: 5.38 THOUSAND/UL (ref 4.31–10.16)
WBC #/AREA URNS AUTO: ABNORMAL /HPF

## 2024-04-03 PROCEDURE — 80053 COMPREHEN METABOLIC PANEL: CPT

## 2024-04-03 PROCEDURE — 86141 C-REACTIVE PROTEIN HS: CPT

## 2024-04-03 PROCEDURE — 93350 STRESS TTE ONLY: CPT

## 2024-04-03 PROCEDURE — 83036 HEMOGLOBIN GLYCOSYLATED A1C: CPT

## 2024-04-03 PROCEDURE — 84443 ASSAY THYROID STIM HORMONE: CPT

## 2024-04-03 PROCEDURE — 85025 COMPLETE CBC W/AUTO DIFF WBC: CPT

## 2024-04-03 PROCEDURE — 36415 COLL VENOUS BLD VENIPUNCTURE: CPT

## 2024-04-03 PROCEDURE — 93306 TTE W/DOPPLER COMPLETE: CPT | Performed by: INTERNAL MEDICINE

## 2024-04-03 PROCEDURE — 93010 ELECTROCARDIOGRAM REPORT: CPT | Performed by: INTERNAL MEDICINE

## 2024-04-03 PROCEDURE — 93306 TTE W/DOPPLER COMPLETE: CPT

## 2024-04-03 PROCEDURE — 93350 STRESS TTE ONLY: CPT | Performed by: INTERNAL MEDICINE

## 2024-04-03 PROCEDURE — 80061 LIPID PANEL: CPT

## 2024-04-03 PROCEDURE — 84153 ASSAY OF PSA TOTAL: CPT

## 2024-04-03 PROCEDURE — 82306 VITAMIN D 25 HYDROXY: CPT

## 2024-04-03 PROCEDURE — 93005 ELECTROCARDIOGRAM TRACING: CPT

## 2024-04-03 PROCEDURE — 81001 URINALYSIS AUTO W/SCOPE: CPT

## 2024-04-03 RX ORDER — EZETIMIBE 10 MG/1
5 TABLET ORAL DAILY
COMMUNITY

## 2024-04-03 NOTE — ASSESSMENT & PLAN NOTE
Your nighttime and occasional daytime urinary symptoms are likely due to your enlarged prostate.  Overall, your symptoms are mild.  You may want to consider starting a medication by the name of Flomax to help with your urinary symptoms.  This can be discussed further with your PCP.

## 2024-04-03 NOTE — PATIENT INSTRUCTIONS
Shaquille, it was a pleasure to host you today for your [facility] physical. I recommend you return in 1 year for a Full Day physical. If you have any questions regarding today's exam please feel free to reach out to me. We look forward to seeing you again soon.      Rajesh Brar MD  [phone]  [email or web address]

## 2024-04-03 NOTE — PROGRESS NOTES
HEART AND VASCULAR SUMMARY    1. Lipids: Total 160, , HDL 50, LDL 88    2. ECG: Normal Sinus Rhythm    3. Echocardiogram: Normal left ventricular function    4. Stress ECHO: Normal study    5. Coronary Calcium CT: 0 ( 2022)    6. The 10-year ASCVD risk score (Patricia OLIVA, et al., 2019) is: 4.4%    Values used to calculate the score:      Age: 53 years      Sex: Male      Is Non- : No      Diabetic: No      Tobacco smoker: No      Systolic Blood Pressure: 126 mmHg      Is BP treated: Yes      HDL Cholesterol: 50 mg/dL      Total Cholesterol: 160 mg/dL     7. HSCRP:   Lab Results   Component Value Date    HSCRP 0.44 04/03/2024       8. Vitals:   Vitals:    04/03/24 0950   BP: 126/78   Pulse: 65   Temp: 97.6 °F (36.4 °C)   SpO2: 98%       Impression and Recommendations:    Normal stress testing.  Normal left ventricular function  Continue medical management for hypertension.  Recommend heart healthy lifestyle.

## 2024-04-03 NOTE — PROGRESS NOTES
"  Your [facility] Board-Certified Dermatologist's Name: Tiago Bright MD    Skin Screening Exam:    A chaperone was present throughout the entire encounter.      SKIN:  FULL ORGAN SYSTEM EXAM   Hair, Scalp, Ears, Face Normal except as noted below in Assessment   Neck, Cervical Chain Nodes Normal except as noted below in Assessment   Right Arm/Hand/Fingers Normal except as noted below in Assessment   Left Arm/Hand/Fingers Normal except as noted below in Assessment   Chest/Breasts/Axillae Did the patient specifically refuse to have the areas \"under-the-bra\" examined by the Dermatologist? No  Examined areas normal except as noted below in Assessment   Abdomen, Umbilicus Normal except as noted below in Assessment   Back/Spine Normal except as noted below in Assessment   Groin/Genitalia/Buttocks Did the patient specifically refuse to have the areas \"under-the-underwear\" examined by the Dermatologist? No  Examined areas normal except as noted below in Assessment   Right Leg, Foot, Toes Normal except as noted below in Assessment   Left Leg, Foot, Toes Normal except as noted below in Assessment        Assessment and Plan by Diagnosis:    Use a moisturizer + sunscreen \"combo\" product such as Neutrogena Daily Defense SPF 50+ or CeraVe AM at least three times a day.  Follow-up with your private dermatologist or one of our board-certified [facility] Dermatologists as discussed.  [facility]'s own Dr. Evy Looney is available for consultation for skin enhancement and revitalization services; please mention \"[facility]\" for expedited scheduling      MELANOCYTIC NEVI (\"Moles\")    Physical Exam:  Anatomic Location Affected:   All over body  Morphological Description:  Scattered, 1-4mm round to ovoid, symmetrical-appearing, even bordered, skin colored to dark brown macules/papules, mostly in sun-exposed areas  Pertinent Positives:  Pertinent Negatives:    Additional History of Present Condition:  NA    Assessment and " "Plan:  Based on a thorough discussion of this condition and the management approach to it (including a comprehensive discussion of the known risks, side effects and potential benefits of treatment), the patient (family) agrees to implement the following specific plan:  Continue to monitor     Melanocytic Nevi  Melanocytic nevi (\"moles\") are tan or brown, raised or flat areas of the skin which have an increased number of melanocytes. Melanocytes are the cells in our body which make pigment and account for skin color.    Some moles are present at birth (I.e., \"congenital nevi\"), while others come up later in life (i.e., \"acquired nevi\").  The sun can stimulate the body to make more moles.  Sunburns are not the only thing that triggers more moles.  Chronic sun exposure can do it too.     Clinically distinguishing a healthy mole from melanoma may be difficult, even for experienced dermatologists. The \"ABCDE's\" of moles have been suggested as a means of helping to alert a person to a suspicious mole and the possible increased risk of melanoma.  The suggestions for raising alert are as follows:    Asymmetry: Healthy moles tend to be symmetric, while melanomas are often asymmetric.  Asymmetry means if you draw a line through the mole, the two halves do not match in color, size, shape, or surface texture. Asymmetry can be a result of rapid enlargement of a mole, the development of a raised area on a previously flat lesion, scaling, ulceration, bleeding or scabbing within the mole.  Any mole that starts to demonstrate \"asymmetry\" should be examined promptly by a board certified dermatologist.     Border: Healthy moles tend to have discrete, even borders.  The border of a melanoma often blends into the normal skin and does not sharply delineate the mole from normal skin.  Any mole that starts to demonstrate \"uneven borders\" should be examined promptly by a board certified dermatologist.     Color: Healthy moles tend to be one " "color throughout.  Melanomas tend to be made up of different colors ranging from dark black, blue, white, or red.  Any mole that demonstrates a color change should be examined promptly by a board certified dermatologist.     Diameter: Healthy moles tend to be smaller than 0.6 cm in size; an exception are \"congenital nevi\" that can be larger.  Melanomas tend to grow and can often be greater than 0.6 cm (1/4 of an inch, or the size of a pencil eraser). This is only a guideline, and many normal moles may be larger than 0.6 cm without being unhealthy.  Any mole that starts to change in size (small to bigger or bigger to smaller) should be examined promptly by a board certified dermatologist.     Evolving: Healthy moles tend to \"stay the same.\"  Melanomas may often show signs of change or evolution such as a change in size, shape, color, or elevation.  Any mole that starts to itch, bleed, crust, burn, hurt, or ulcerate or demonstrate a change or evolution should be examined promptly by a board certified dermatologist.      Dysplastic Nevi  Dysplastic moles are moles that fit the ABCDE rules of melanoma but are not identified as melanomas when examined under the microscope.  They may indicate an increased risk of melanoma in that person. If there is a family history of melanoma, most experts agree that the person may be at an increased risk for developing a melanoma.  Experts still do not agree on what dysplastic moles mean in patients without a personal or family history of melanoma.  Dysplastic moles are usually larger than common moles and have different colors within it with irregular borders. The appearance can be very similar to a melanoma. Biopsies of dysplastic moles may show abnormalities which are different from a regular mole.      Melanoma  Malignant melanoma is a type of skin cancer that can be deadly if it spreads throughout the body. The incidence of melanoma in the [place] is growing faster than any " "other cancer. Melanoma usually grows near the surface of the skin for a period of time, and then begins to grow deeper into the skin. Once it grows deeper into the skin, the risk of spread to other organs greatly increases. Therefore, early detection and removal of a malignant melanoma may result in a better chance at a complete cure; removal after the tumor has spread may not be as effective, leading to worse clinical outcomes such as death.    The true rate of nevus transformation into a melanoma is unknown. It has been estimated that the lifetime risk for any acquired melanocytic nevus on any 20-year-old individual transforming into melanoma by age 80 is 0.03% (1 in 3,164) for men and 0.009% (1 in 10,800) for women.     The appearance of a \"new mole\" remains one of the most reliable methods for identifying a malignant melanoma.  Occasionally, melanomas appear as rapidly growing, blue-black, dome-shaped bumps within a previous mole or previous area of normal skin.  Other times, melanomas are suspected when a mole suddenly appears or changes. Itching, burning, or pain in a pigmented lesion should increase suspicion, but most patients with early melanoma have no skin discomfort whatsoever.  Melanoma can occur anywhere on the skin, including areas that are difficult for self-examination. Many melanomas are first noticed by other family members.  Suspicious-looking moles may be removed for microscopic examination.       You may be able to prevent death from melanoma by doing two simple things:    Try to avoid unnecessary sun exposure and protect your skin when it is exposed to the sun.  People who live near the equator, people who have intermittent exposures to large amounts of sun, and people who have had sunburns in childhood or adolescence have an increased risk for melanoma. Sun sense and vigilant sun protection may be keys to helping to prevent melanoma.  We recommend wearing UPF-rated sun protective clothing and " "sunglasses whenever possible and applying a moisturizer-sunscreen combination product (SPF 50+) such as Neutrogena Daily Defense to sun exposed areas of skin at least three times a day.    Have your moles regularly examined by a board certified dermatologist AND by yourself or a family member/friend at home.  We recommend that you have your moles examined at least once a year by a board certified dermatologist.  Use your birthday as an annual reminder to have your \"Birthday Suit\" (I.e., your skin) examined; it is a nice birthday gift to yourself to know that your skin is healthy appearing!  Additionally, at-home self examinations may be helpful for detecting a possible melanoma.  Use the ABCDEs we discussed and check your moles once a month at home.    "

## 2024-04-03 NOTE — PROGRESS NOTES
Fitness Summary and Recommendations: Shaquille scored at the 95% on his body composition assessment with a bodyfat % of 12.3%. Shaquille scored in the above average range for flexibility with a sit & reach score of 25 cm. His Muscle Strength/Endurance scores placed him at the 95% (lower body) and 95% (upper body) with a chair stand score of 36 and arm curl score of 39 respectively. Shaquille Cardiovascular Score of 60.3 placed him at the 95%. Overall, Shaquille would be considered to have an excellent fitness level with a 89% score. His overall fitness score has increased by 8% from his previous test on 03/08/22. Continued emphasis on regular exercise and sound nutrition practices will enable Shaquille to reach his optimal level of fitness.

## 2024-04-03 NOTE — ASSESSMENT & PLAN NOTE
Your last thyroid ultrasound in 2022 revealed a thyroid nodule that was stable in size. Based on the current guidelines your 0.9cm nodule did not meet criteria for biopsy or additional surveillance.

## 2024-04-03 NOTE — ASSESSMENT & PLAN NOTE
Your blood pressure is well-controlled on your current regimen of nebivolol 5 mg daily and lisinopril 10 mg daily.  Continue these medications as prescribed.  Continue follow-up with your cardiologist as scheduled.

## 2024-04-03 NOTE — ASSESSMENT & PLAN NOTE
Overall you appear to be in good health.  Please incorporate some type of strength/resistance training to your workout regimen at least 2 times a week.  Increase your protein intake to 1.3 to 1.5 g/kg.

## 2024-04-03 NOTE — ASSESSMENT & PLAN NOTE
Your LDL cholesterol has decreased from 102 down to 88. Continue with your current dietary and lifestyle changes.

## 2024-04-03 NOTE — PROGRESS NOTES
[facility] Physical Exam Half Day      Shaquille Ponce is a 53 y.o. male who is presenting for an [facility] Physical Exam at [facility].     His past medical history is notable for primary hypertension, hyperlipidemia and an enlarged prostate.  Overall he states he feels well and has no major complaints.  For blood pressure control he currently takes lisinopril 10 mg daily and nebivolol 5 mg daily.  He reports he is followed by a cardiologist in [place].     For his high cholesterol he states he was started on Zetia 10 mg daily.  Since then he notes he has made significant dietary changes in the form of cutting out pastries and incorporating oatmeal every morning for breakfast.  Over the past several weeks he states he has cut his Zetia dose back to 5 mg daily.    From a dietary standpoint he feels his diet is well-balanced and includes plenty of fiber, lean protein and vegetables.  He does not eat junk food.  He also states he is physically active and performs interval training on his Bowflex for 18 minutes at least 5 times a week in addition to going for a brisk walk for 2.5 miles 2-3 times a week.    His only complaint today is regarding his enlarged prostate.  He notes over the past year that he has been experiencing at least 1 night awakening due to the urge to urinate.  He has also noticed some occasional weak stream during the day.    Review of Systems   Constitutional:  Negative for chills and fever.   HENT:  Negative for ear pain and sore throat.    Eyes:  Negative for pain and visual disturbance.   Respiratory:  Negative for cough and shortness of breath.    Cardiovascular:  Negative for chest pain and palpitations.   Gastrointestinal:  Negative for abdominal pain and vomiting.   Genitourinary:  Negative for dysuria and hematuria.   Musculoskeletal:  Negative for arthralgias and back pain.   Skin:  Negative for color change and rash.   Neurological:  Negative for  seizures and syncope.   All other systems reviewed and are negative.        Active Ambulatory Problems     Diagnosis Date Noted   • Well adult exam 07/22/2020   • HTN (hypertension) 08/12/2020   • Eczema 03/08/2022   • Thyroid nodule 03/08/2022   • Elevated serum creatinine 03/08/2022   • Elevated serum protein level 03/08/2022   • Enlarged prostate 04/03/2024   • Hyperlipidemia 04/03/2024     Resolved Ambulatory Problems     Diagnosis Date Noted   • No Resolved Ambulatory Problems     Past Medical History:   Diagnosis Date   • Hypertension        No past surgical history on file.    No family history on file.    Social History     Tobacco Use   Smoking Status Never   Smokeless Tobacco Not on file         Current Outpatient Medications:   •  ezetimibe (ZETIA) 10 mg tablet, Take 5 mg by mouth daily, Disp: , Rfl:   •  lisinopril (ZESTRIL) 10 mg tablet, Take 10 mg by mouth daily, Disp: , Rfl:   •  nebivolol (BYSTOLIC) 5 mg tablet, Take 1 tablet (5 mg total) by mouth daily, Disp: 30 tablet, Rfl: 0    Allergies   Allergen Reactions   • Nuts - Food Allergy GI Intolerance     Tree nuts   • Penicillins          Objective:     Physical Exam  Constitutional:       Appearance: Normal appearance.   HENT:      Head: Normocephalic and atraumatic.      Right Ear: Tympanic membrane normal.      Left Ear: Tympanic membrane normal.      Nose: Nose normal.      Mouth/Throat:      Mouth: Mucous membranes are moist.      Pharynx: Oropharynx is clear.   Eyes:      Extraocular Movements: Extraocular movements intact.      Conjunctiva/sclera: Conjunctivae normal.      Pupils: Pupils are equal, round, and reactive to light.   Cardiovascular:      Rate and Rhythm: Normal rate and regular rhythm.      Heart sounds: Normal heart sounds. No murmur heard.  Pulmonary:      Effort: Pulmonary effort is normal.      Breath sounds: Normal breath sounds. No wheezing, rhonchi or rales.   Abdominal:      General: Bowel sounds are normal.       "Palpations: Abdomen is soft.      Tenderness: There is no abdominal tenderness. There is no guarding.   Genitourinary:     Comments: Enlarged prostate   Musculoskeletal:         General: Normal range of motion.      Cervical back: Normal range of motion and neck supple.   Skin:     General: Skin is warm and dry.      Findings: No rash.   Neurological:      General: No focal deficit present.      Mental Status: He is alert and oriented to person, place, and time.   Psychiatric:         Mood and Affect: Mood normal.         Thought Content: Thought content normal.           Vitals:    04/03/24 0950   BP: 126/78   Pulse: 65   Temp: 97.6 °F (36.4 °C)   SpO2: 98%   Weight: 85.6 kg (188 lb 12.8 oz)   Height: 5' 11.5\" (1.816 m)       Assessment/Plan:    1. Well adult exam  Assessment & Plan:  Overall you appear to be in good health.  Please incorporate some type of strength/resistance training to your workout regimen at least 2 times a week.  Increase your protein intake to 1.3 to 1.5 g/kg.      2. Primary hypertension  Assessment & Plan:  Your blood pressure is well-controlled on your current regimen of nebivolol 5 mg daily and lisinopril 10 mg daily.  Continue these medications as prescribed.  Continue follow-up with your cardiologist as scheduled.      3. Thyroid nodule  Assessment & Plan:  Your last thyroid ultrasound in 2022 revealed a thyroid nodule that was stable in size. Based on the current guidelines your 0.9cm nodule did not meet criteria for biopsy or additional surveillance.       4. Enlarged prostate  Assessment & Plan:  Your nighttime and occasional daytime urinary symptoms are likely due to your enlarged prostate.  Overall, your symptoms are mild.  You may want to consider starting a medication by the name of Flomax to help with your urinary symptoms.  This can be discussed further with your PCP.      5. Hyperlipidemia, unspecified hyperlipidemia type  Assessment & Plan:  Your LDL cholesterol has decreased " from 102 down to 88. Continue with your current dietary and lifestyle changes.